# Patient Record
Sex: MALE | NOT HISPANIC OR LATINO | URBAN - METROPOLITAN AREA
[De-identification: names, ages, dates, MRNs, and addresses within clinical notes are randomized per-mention and may not be internally consistent; named-entity substitution may affect disease eponyms.]

---

## 2021-03-31 DIAGNOSIS — Z23 ENCOUNTER FOR IMMUNIZATION: ICD-10-CM

## 2022-07-19 ENCOUNTER — OFFICE VISIT (OUTPATIENT)
Dept: CARDIOLOGY CLINIC | Facility: CLINIC | Age: 57
End: 2022-07-19
Payer: COMMERCIAL

## 2022-07-19 VITALS
HEIGHT: 70 IN | HEART RATE: 77 BPM | WEIGHT: 205 LBS | SYSTOLIC BLOOD PRESSURE: 110 MMHG | BODY MASS INDEX: 29.35 KG/M2 | DIASTOLIC BLOOD PRESSURE: 60 MMHG

## 2022-07-19 DIAGNOSIS — R55 SYNCOPE AND COLLAPSE: Primary | ICD-10-CM

## 2022-07-19 DIAGNOSIS — I10 PRIMARY HYPERTENSION: ICD-10-CM

## 2022-07-19 PROCEDURE — 93000 ELECTROCARDIOGRAM COMPLETE: CPT | Performed by: INTERNAL MEDICINE

## 2022-07-19 PROCEDURE — 99204 OFFICE O/P NEW MOD 45 MIN: CPT | Performed by: INTERNAL MEDICINE

## 2022-07-19 RX ORDER — CANDESARTAN 16 MG/1
16 TABLET ORAL DAILY
Qty: 30 TABLET | Refills: 11 | Status: SHIPPED | OUTPATIENT
Start: 2022-07-19

## 2022-07-19 RX ORDER — OMEPRAZOLE 20 MG/1
20 CAPSULE, DELAYED RELEASE ORAL DAILY
COMMUNITY

## 2022-07-19 RX ORDER — FAMOTIDINE 20 MG/1
20 TABLET, FILM COATED ORAL DAILY
COMMUNITY

## 2022-07-19 RX ORDER — OMEGA-3 FATTY ACIDS CAP DELAYED RELEASE 1000 MG 1000 MG
1 CAPSULE DELAYED RELEASE ORAL
COMMUNITY

## 2022-07-19 RX ORDER — CANDESARTAN CILEXETIL AND HYDROCHLOROTHIAZIDE 16; 12.5 MG/1; MG/1
1 TABLET ORAL DAILY
COMMUNITY
Start: 2022-05-19 | End: 2022-07-19

## 2022-07-19 RX ORDER — ZOLPIDEM TARTRATE 6.25 MG/1
6.25 TABLET, FILM COATED, EXTENDED RELEASE ORAL
COMMUNITY
Start: 2022-07-18

## 2022-07-19 RX ORDER — ALPRAZOLAM 0.5 MG/1
0.5 TABLET ORAL DAILY PRN
COMMUNITY
Start: 2022-06-15

## 2022-07-19 NOTE — PROGRESS NOTES
[address] [facility]  Office Consultation  Annmarie Medina 65 y o  male MRN: [id number]        Chief Complaint    Chief Complaint   Patient presents with    Loss of Consciousness     NP - one episode of syncope appx 2 mos ago following not eating  No cardiac sx  Referring Provider: No ref  provider found    Impression & Plan:    1  Syncope and collapse  Event was likely due to hypovolemia in the setting of decreased p o  intake and HCTZ therapy  Labs at the time revealed EYAD as well  Now symptoms have mostly resolved  His blood pressure goal is under 130/80, he is adequately below goal today 110/60  I have recommended stopping HCTZ and we will start candesartan stand-alone 60 mg daily  He will follow up with his nephrologist in 1 month  He is having frequent PACs on echo  We will check a Holter monitor to rule out arrhythmias   - POCT ECG  - Holter monitor; Future    2  Primary hypertension  As above  - candesartan (ATACAND) 16 mg tablet; Take 1 tablet (16 mg total) by mouth daily  Dispense: 30 tablet; Refill: 11        We will see Annmarie Medina back PRN    HPI: Annmarie Medina is a 65y o  year old male who presents for further evaluation of syncope  He was seen by his primary care on 06/09/2022 [facility])  I reviewed the documentation in its entirety  He had complained to them of fainting on 06/04/2022, at which time he went to the ER but then returned home due to the long wait  He complained of persistent lightheadedness and weakness at that time  He had been having diverticulitis symptoms, long history of complicated diverticulitis with multiple surgeries  He was tx with antibiotics and had been watching what he was eating  He was eating and drinking less  Finally he started having lightheadedness  On 6/4/22 he was laying down trying to nap, couldn't sleep, stood up fast and pretty soon thereafter lost consciousness  He is unsure how long he was on the floor           Review of Systems   Constitutional: Negative for activity change and unexpected weight change  HENT: Negative for facial swelling  Eyes: Negative for visual disturbance  Respiratory: Negative for cough and chest tightness  Cardiovascular: Negative for chest pain  Gastrointestinal: Negative for blood in stool  Musculoskeletal: Negative for myalgias  Skin: Negative for pallor  Allergic/Immunologic: Negative for immunocompromised state  Neurological: Negative for syncope and light-headedness  Psychiatric/Behavioral: Negative for agitation and hallucinations  No past medical history on file  No past surgical history on file  Social History     Substance and Sexual Activity   Alcohol Use Not on file     Social History     Substance and Sexual Activity   Drug Use Not on file     Social History     Tobacco Use   Smoking Status Not on file   Smokeless Tobacco Not on file     No family history on file  Allergies: Allergies   Allergen Reactions    Naproxen Abdominal Pain and Nausea Only    Nsaids Other (See Comments)       Medications (as of START of this encounter):   No outpatient medications prior to visit  No facility-administered medications prior to visit  There were no vitals filed for this visit  Weight (last 2 days)     Date/Time Weight    07/19/22 1408 93 (205)          General: Arnel Patel is a well appearing male, in no acute distress, sitting comfortably  HEENT: moist mucous membranes, EOMI  Neck:  No JVD, supple, trachea midline   Cardiovascular: unremarkable S1/S2, regular rate and rhythm, no murmurs, rubs or gallops   Pulmonary: normal respiratory effort, CTAB   Abdomen: soft and nondistended  Extremities: No lower extremity edema  Warm and well perfused extremities     Neuro: no focal motor deficits, AAOx3 (person, place, time)  Psych: Normal mood and affect, cooperative      Laboratory Studies:    Laboratory studies personally reviewed  CMP 06/10/2022, creatinine 1 76, BUN 30, otherwise unremarkable electrolytes and liver enzymes  CBC 6/10/22 hemoglobin of 16 9, WBC 8 3, platelets elevated 351  TSH 06/10/2022 1 41    Cardiac testing:     EKG reviewed personally:   Personally reviewed and independently reinterpreted EKG from 06/09/2022 (scanned media 07/07/2022) which shows normal sinus rhythm, 75 bpm, normal axis, normal intervals  Nonspecific inferior T-wave flattening, otherwise unremarkable  EKG today shows sinus rhythm, frequent premature atrial complexes with aberrant conduction, normal axis, normal intervals  Borderline study  Time Spent:  Total time (face-to-face and non-face-to-face) spent on today's visit was 40 minutes  This includes preparation for the visits (i e  reviewing test results), performance of a medically appropriate history and examination, and orders for medications, tests or other procedures  This time is exclusive of procedures performed and time spent teaching  Stacie Gomez MD    This note was completed in part utilizing [employer] direct voice recognition software  Grammatical errors, random word insertion, spelling mistakes, occasional wrong word or "sound-alike" substitutions and incomplete sentences may be an occasional consequence of the system secondary to software limitations, ambient noise and hardware issues  At the time of dictation, efforts were made to edit, clarify and /or correct errors  Please read the chart carefully and recognize, using context, where substitutions have occurred  If you have any questions or concerns about the context, text or information contained within the body of this dictation, please contact myself, the provider, for further clarification

## 2022-07-22 ENCOUNTER — HOSPITAL ENCOUNTER (OUTPATIENT)
Dept: NON INVASIVE DIAGNOSTICS | Facility: HOSPITAL | Age: 57
Discharge: HOME | End: 2022-07-22
Attending: INTERNAL MEDICINE
Payer: COMMERCIAL

## 2022-07-22 DIAGNOSIS — R55 SYNCOPE AND COLLAPSE: ICD-10-CM

## 2022-07-22 PROCEDURE — 93226 XTRNL ECG REC<48 HR SCAN A/R: CPT

## 2022-07-22 PROCEDURE — 93225 XTRNL ECG REC<48 HRS REC: CPT

## 2022-08-02 ENCOUNTER — PATIENT MESSAGE (OUTPATIENT)
Dept: CARDIOLOGY CLINIC | Facility: CLINIC | Age: 57
End: 2022-08-02

## 2022-08-02 PROCEDURE — 93227 XTRNL ECG REC<48 HR R&I: CPT | Performed by: INTERNAL MEDICINE

## 2022-08-03 ENCOUNTER — TELEPHONE (OUTPATIENT)
Dept: CARDIOLOGY CLINIC | Facility: CLINIC | Age: 57
End: 2022-08-03

## 2022-08-03 DIAGNOSIS — I49.1 PAC (PREMATURE ATRIAL CONTRACTION): Primary | ICD-10-CM

## 2022-08-03 RX ORDER — METOPROLOL SUCCINATE 25 MG/1
25 TABLET, EXTENDED RELEASE ORAL DAILY
Qty: 90 TABLET | Refills: 0 | Status: SHIPPED | OUTPATIENT
Start: 2022-08-03 | End: 2022-10-19

## 2022-08-03 NOTE — TELEPHONE ENCOUNTER
----- Message from Francisco Stewart MD sent at 8/3/2022 10:05 AM EDT -----  Please let Mr Harvey Rapp know that his Holter showed very frequent premature heart beats as we expected, but no arrhythmias  I would like to try to suppress these premature beats to some extent with metoprolol XL 25 mg daily to start  If he is agreeable please Rx for cosign  We can then see him back in 3 months to re-evaluate

## 2022-08-10 ENCOUNTER — TELEPHONE (OUTPATIENT)
Dept: CARDIOLOGY CLINIC | Facility: CLINIC | Age: 57
End: 2022-08-10

## 2022-08-10 NOTE — TELEPHONE ENCOUNTER
Pt scheduled for colonoscopy on 8/25/22 with Christina Cao MD      Pt LOV 7/19/22     Ok to clear with letter?     Please advise

## 2022-10-18 DIAGNOSIS — I49.1 PAC (PREMATURE ATRIAL CONTRACTION): ICD-10-CM

## 2022-10-19 RX ORDER — METOPROLOL SUCCINATE 25 MG/1
TABLET, EXTENDED RELEASE ORAL
Qty: 90 TABLET | Refills: 0 | Status: SHIPPED | OUTPATIENT
Start: 2022-10-19

## 2022-12-08 ENCOUNTER — OFFICE VISIT (OUTPATIENT)
Dept: CARDIOLOGY CLINIC | Facility: CLINIC | Age: 57
End: 2022-12-08

## 2022-12-08 VITALS
OXYGEN SATURATION: 97 % | WEIGHT: 207.8 LBS | HEIGHT: 70 IN | SYSTOLIC BLOOD PRESSURE: 122 MMHG | BODY MASS INDEX: 29.75 KG/M2 | DIASTOLIC BLOOD PRESSURE: 78 MMHG | HEART RATE: 52 BPM

## 2022-12-08 DIAGNOSIS — I10 PRIMARY HYPERTENSION: Primary | ICD-10-CM

## 2022-12-08 DIAGNOSIS — I49.3 PVC (PREMATURE VENTRICULAR CONTRACTION): ICD-10-CM

## 2022-12-08 DIAGNOSIS — I49.1 PAC (PREMATURE ATRIAL CONTRACTION): ICD-10-CM

## 2022-12-08 NOTE — PROGRESS NOTES
Cardiology Outpatient Follow-Up Note - Valerio Shah 65 y o  male MRN: [id number]      Assessment/Plan:  1  Primary hypertension  We previously stopped HCTZ  His blood pressure is currently controlled  Goal blood pressure under 130/80  He is presently on candesartan 16 mg daily and metoprolol succinate 25 mg daily   - Echo complete w/ contrast if indicated; Future    2  PVC (premature ventricular contraction)  - Echo complete w/ contrast if indicated; Future    3  PAC (premature atrial contraction)  - Echo complete w/ contrast if indicated; Future  - Holter monitor; Future    The patient had a premature ectopic burden of about 30%  This was analyzed as about 16% PVC burden and 13% PAC burden  In review of his Holter monitor strips, I believe he is likely having almost entirely premature supraventricular ectopy with some aberrantly conducted beats  This does lower my concern as a high PVC burden would be more associated with cardiomyopathy, whereas premature supraventricular ectopy tends to be more benign  We will check an echocardiogram to rule out structural abnormalities  We will continue suppressive therapy with metoprolol succinate 25 mg daily  Due to resting sinus bradycardia, he would not tolerate further increase of metoprolol or addition of flecainide at this time  We will check a repeat Holter to requantify his ectopic burden on metoprolol therapy  We will see Valerio Shah back in 6 months for routine follow-up  Subjective:     HPI: Valerio Shah is a 65y o  year old male with hypertension who initially presented to me on 7/19/2022 with complaints of syncope  Since then we have found that he has extremely frequent premature supraventricular ectopy  On initial evaluation we found that his syncope was likely due to hypovolemia in the setting of decreased p o  intake and concomitant hydrochlorothiazide therapy    At that time we stopped hydrochlorothiazide and his symptoms have totally resolved  He has had no recurrent syncope or presyncope  We did perform a Holter monitor on 7/22/2022  Holter monitor on 7/22/2022 personally reviewed and independently read interpreted by me showed an average heart rate of 68 bpm, predominantly sinus rhythm, very frequent PVCs and PACs  PVCs (by automatic count) comprise 16 4% of monitored beats, all single ventricular ectopics without any sustained ventricular arrhythmias  Supraventricular ectopics comprise 13 3% of monitored beats, mostly single PACs and atrial trigeminy  There were rare atrial runs lasting only up to 3 beats  Review of many of the reported PVCs suggest PAC with aberrant conduction, as preceding P wave is frequently visible  The patient has no symptoms of PACs or PVCs whatsoever  He does not feel an irregular heartbeat  He has not had recurrent lightheadedness, presyncope  He has no signs or symptoms of congestive heart failure  ROS:  Review of Systems:  Review of Systems   Constitutional: Negative for activity change and unexpected weight change  HENT: Negative for facial swelling  Eyes: Negative for visual disturbance  Respiratory: Negative for cough and chest tightness  Cardiovascular: Negative for chest pain  Gastrointestinal: Negative for blood in stool  Musculoskeletal: Negative for myalgias  Skin: Negative for pallor  Allergic/Immunologic: Negative for immunocompromised state  Neurological: Negative for syncope and light-headedness  Psychiatric/Behavioral: Negative for agitation and hallucinations  Objective:     Vitals:   Vitals:    12/08/22 1457   BP: 122/78   BP Location: Left arm   Patient Position: Sitting   Cuff Size: Standard   Pulse: (!) 52   SpO2: 97%   Weight: 94 3 kg (207 lb 12 8 oz)   Height: 5' 10" (1 778 m)    Body surface area is 2 12 meters squared    Wt Readings from Last 3 Encounters:   12/08/22 94 3 kg (207 lb 12 8 oz)   07/19/22 93 kg (205 lb)       Physical Exam:  General: Twyla Perez is a well appearing male, in no acute distress, sitting comfortably  HEENT: moist mucous membranes, EOMI  Neck:  No JVD, supple, trachea midline  Cardiovascular: unremarkable S1/S2, regular rate, regular rhythm with frequent extrasystoles, no murmurs, rubs or gallops  Pulmonary: normal respiratory effort, CTAB  Abdomen: soft and nondistended  Extremities: No lower extremity edema  Warm and well perfused extremities  Neuro: no focal motor deficits, AAOx3 (person, place, time)  Psych: Normal mood and affect, cooperative      Medications (at the START of this encounter): Outpatient Medications Prior to Visit   Medication Sig Dispense Refill   • ALPRAZolam (XANAX) 0 5 mg tablet Take 0 5 mg by mouth daily as needed     • candesartan (ATACAND) 16 mg tablet Take 1 tablet (16 mg total) by mouth daily 30 tablet 11   • Cholecalciferol 25 MCG (1000 UT) capsule Take 2,000 Units by mouth daily     • famotidine (PEPCID) 20 mg tablet Take 20 mg by mouth daily     • metoprolol succinate (TOPROL-XL) 25 mg 24 hr tablet TAKE ONE TABLET BY MOUTH EVERY DAY 90 tablet 0   • Omega-3 Fatty Acids (Fish Oil) 1000 MG CPDR Take 1 capsule by mouth     • omeprazole (PriLOSEC) 20 mg delayed release capsule Take 20 mg by mouth daily     • zolpidem (AMBIEN CR) 6 25 MG CR tablet Take 6 25 mg by mouth daily at bedtime as needed       No facility-administered medications prior to visit  Labs & Results:        EKG personally reviewed:  n/a        Time Spent:  Total time (face-to-face and non-face-to-face) spent on today's visit was 25 minutes  This includes preparation for the visits (i e  reviewing test results), performance of a medically appropriate history and examination, and orders for medications, tests or other procedures  This time is exclusive of procedures performed and time spent teaching  This note was completed in part utilizing [employer] direct voice recognition software  Grammatical errors, random word insertion, spelling mistakes, occasional wrong word or "sound-alike" substitutions and incomplete sentences may be an occasional consequence of the system secondary to software limitations, ambient noise and hardware issues  At the time of dictation, efforts were made to edit, clarify and /or correct errors  Please read the chart carefully and recognize, using context, where substitutions have occurred  If you have any questions or concerns about the context, text or information contained within the body of this dictation, please contact myself, the provider, for further clarification

## 2022-12-16 ENCOUNTER — HOSPITAL ENCOUNTER (OUTPATIENT)
Dept: NON INVASIVE DIAGNOSTICS | Age: 57
Discharge: HOME | End: 2022-12-16

## 2022-12-16 VITALS
HEIGHT: 70 IN | HEART RATE: 56 BPM | BODY MASS INDEX: 29.63 KG/M2 | DIASTOLIC BLOOD PRESSURE: 68 MMHG | SYSTOLIC BLOOD PRESSURE: 104 MMHG | WEIGHT: 207 LBS

## 2022-12-16 DIAGNOSIS — I10 PRIMARY HYPERTENSION: ICD-10-CM

## 2022-12-16 DIAGNOSIS — I49.1 PAC (PREMATURE ATRIAL CONTRACTION): ICD-10-CM

## 2022-12-16 DIAGNOSIS — I49.3 PVC (PREMATURE VENTRICULAR CONTRACTION): ICD-10-CM

## 2022-12-16 LAB
AORTIC ROOT: 3.3 CM
APICAL FOUR CHAMBER EJECTION FRACTION: 63 %
ASCENDING AORTA: 3.1 CM
DOP CALC LVOT AREA: 3.46 CM2
DOP CALC LVOT DIAMETER: 2.1 CM
E WAVE DECELERATION TIME: 215 MS
FRACTIONAL SHORTENING: 40 % (ref 28–44)
INTERVENTRICULAR SEPTUM IN DIASTOLE (PARASTERNAL SHORT AXIS VIEW): 1 CM
INTERVENTRICULAR SEPTUM: 1.1 CM (ref 0.6–1.1)
LAAS-AP2: 18 CM2
LAAS-AP4: 17.8 CM2
LEFT ATRIUM AREA SYSTOLE SINGLE PLANE A4C: 16.7 CM2
LEFT ATRIUM SIZE: 4.3 CM
LEFT INTERNAL DIMENSION IN SYSTOLE: 3 CM (ref 2.1–4)
LEFT VENTRICULAR INTERNAL DIMENSION IN DIASTOLE: 5 CM (ref 3.5–6)
LEFT VENTRICULAR POSTERIOR WALL IN END DIASTOLE: 0.9 CM
LEFT VENTRICULAR STROKE VOLUME: 84 ML
LVSV (TEICH): 84 ML
MV E'TISSUE VEL-SEP: 10 CM/S
MV PEAK A VEL: 0.62 M/S
MV PEAK E VEL: 61 CM/S
MV STENOSIS PRESSURE HALF TIME: 62 MS
MV VALVE AREA P 1/2 METHOD: 3.55 CM2
RIGHT ATRIUM AREA SYSTOLE A4C: 18 CM2
RIGHT VENTRICLE ID DIMENSION: 4.1 CM
SL CV LEFT ATRIUM LENGTH A2C: 5.8 CM
SL CV LV EF: 60
SL CV PED ECHO LEFT VENTRICLE DIASTOLIC VOLUME (MOD BIPLANE) 2D: 121 ML
SL CV PED ECHO LEFT VENTRICLE SYSTOLIC VOLUME (MOD BIPLANE) 2D: 36 ML
TRICUSPID ANNULAR PLANE SYSTOLIC EXCURSION: 2.8 CM

## 2022-12-19 ENCOUNTER — TELEPHONE (OUTPATIENT)
Dept: CARDIOLOGY CLINIC | Facility: CLINIC | Age: 57
End: 2022-12-19

## 2023-01-03 ENCOUNTER — TELEPHONE (OUTPATIENT)
Dept: CARDIOLOGY CLINIC | Facility: CLINIC | Age: 58
End: 2023-01-03

## 2023-01-03 NOTE — TELEPHONE ENCOUNTER
----- Message from Blaine Walters MD sent at 1/3/2023  2:15 PM EST -----  Please call patient  He is having an improved number of premature beats, although still much more frequent than normal  The good news is that of the two types of premature beats we discussed at the last office visit, they are of the less concerning variety  Nothing to do at this time  Continue current therapy

## 2023-01-25 DIAGNOSIS — I49.1 PAC (PREMATURE ATRIAL CONTRACTION): ICD-10-CM

## 2023-01-26 RX ORDER — METOPROLOL SUCCINATE 25 MG/1
TABLET, EXTENDED RELEASE ORAL
Qty: 90 TABLET | Refills: 3 | Status: SHIPPED | OUTPATIENT
Start: 2023-01-26

## 2023-06-19 ENCOUNTER — HOSPITAL ENCOUNTER (EMERGENCY)
Facility: HOSPITAL | Age: 58
Discharge: HOME | End: 2023-06-20
Attending: EMERGENCY MEDICINE | Admitting: EMERGENCY MEDICINE
Payer: COMMERCIAL

## 2023-06-19 VITALS
DIASTOLIC BLOOD PRESSURE: 88 MMHG | SYSTOLIC BLOOD PRESSURE: 156 MMHG | HEART RATE: 55 BPM | TEMPERATURE: 97.7 F | RESPIRATION RATE: 18 BRPM | BODY MASS INDEX: 31.4 KG/M2 | OXYGEN SATURATION: 100 % | WEIGHT: 212 LBS | HEIGHT: 69 IN

## 2023-06-19 DIAGNOSIS — M54.12 CERVICAL RADICULOPATHY: Primary | ICD-10-CM

## 2023-06-19 PROCEDURE — 99283 EMERGENCY DEPT VISIT LOW MDM: CPT

## 2023-06-19 PROCEDURE — 96372 THER/PROPH/DIAG INJ SC/IM: CPT

## 2023-06-19 RX ORDER — CYCLOBENZAPRINE HCL 10 MG
10 TABLET ORAL 2 TIMES DAILY PRN
Qty: 20 TABLET | Refills: 0 | Status: SHIPPED | OUTPATIENT
Start: 2023-06-19

## 2023-06-19 RX ORDER — KETOROLAC TROMETHAMINE 30 MG/ML
15 INJECTION, SOLUTION INTRAMUSCULAR; INTRAVENOUS ONCE
Status: COMPLETED | OUTPATIENT
Start: 2023-06-19 | End: 2023-06-19

## 2023-06-19 RX ORDER — CYCLOBENZAPRINE HCL 10 MG
10 TABLET ORAL ONCE
Status: COMPLETED | OUTPATIENT
Start: 2023-06-19 | End: 2023-06-19

## 2023-06-19 RX ORDER — LIDOCAINE 50 MG/G
1 PATCH TOPICAL ONCE
Status: DISCONTINUED | OUTPATIENT
Start: 2023-06-19 | End: 2023-06-20 | Stop reason: HOSPADM

## 2023-06-19 RX ORDER — ACETAMINOPHEN 325 MG/1
975 TABLET ORAL ONCE
Status: COMPLETED | OUTPATIENT
Start: 2023-06-19 | End: 2023-06-19

## 2023-06-19 RX ADMIN — ACETAMINOPHEN 975 MG: 325 TABLET, FILM COATED ORAL at 23:21

## 2023-06-19 RX ADMIN — KETOROLAC TROMETHAMINE 15 MG: 30 INJECTION, SOLUTION INTRAMUSCULAR; INTRAVENOUS at 23:21

## 2023-06-19 RX ADMIN — CYCLOBENZAPRINE 10 MG: 10 TABLET, FILM COATED ORAL at 23:21

## 2023-06-19 RX ADMIN — LIDOCAINE 1 PATCH: 50 PATCH TOPICAL at 23:21

## 2023-06-20 ENCOUNTER — NURSE TRIAGE (OUTPATIENT)
Dept: PHYSICAL THERAPY | Facility: OTHER | Age: 58
End: 2023-06-20

## 2023-06-20 DIAGNOSIS — M54.2 ACUTE NECK PAIN: Primary | ICD-10-CM

## 2023-06-20 NOTE — TELEPHONE ENCOUNTER
Additional Information  • Negative: Has the patient had unexplained weight loss? • Negative: Does the patient have a fever? • Negative: Is the patient experiencing urine retention? • Negative: Is the patient experiencing blood in sputum? • Negative: Is this a chronic condition? • Negative: Is the patient experiencing acute drop foot or paralysis? • Negative: Has the patient experienced major trauma? (fall from height, high speed collision, direct blow to spine) and is also experiencing nausea, light-headedness, or loss of consciousness? Protocols used: [facility] COMPREHENSIVE SPINE PROGRAM PROTOCOL    This RN did review in detail the Comprehensive Spine Program and what we can provide for their neck pain  Patient is agreeable to being triaged by this RN and would like to proceed with Physical Therapy  Referral was placed for Physical Therapy at the [facility] site  Patients information was sent to the  to make evaluation appointment  Patient made aware that the PT office  will be calling to schedule the appointment  Patient was provided with the phone number to the PT office  No further questions and/or concerns were voiced by the patient at this time  Patient states understanding of the referral that was placed  Referral Closed

## 2023-06-20 NOTE — DISCHARGE INSTRUCTIONS
Take 10 mg flexeril every 12 hours   Take ibuprofen or tylenol every 4-6 hours for pain  Can alternate them every 3 hours as needed   Rest, use heating pads, ice on your back   Avoid heavy lifting   Lidocaine patches for 12 hours on and 12 hours off   Schedule an appointment with the comprehensive spine program   Return to the ER if you develop intense neck pain that is worse or different than before, cant feel or move your arms or legs, numbness, weakness, develop pelvic numbness, bowel or bladder incontinence, fevers, chest pain, shortness of breath, intense headache, facial droop, numbness, weakness, difficulty speaking/walking

## 2023-06-20 NOTE — ED PROVIDER NOTES
History  Chief Complaint   Patient presents with   • Neck Pain     Left neck pain into shoulder  States he has been seen for it multiple times, including a surgeon  Given steroids and gabapentin with no relief  Has a known cyst on left upper back and is scheduled for surgery  Reported doctor would not give anymore pain meds due to surgery being so close  This is a 52-year-old male with past medical history hypertension who presents to the ER today for left-sided neck pain radiating down into left that started on 10 days ago  States he went to urgent care Sunday was prescribed prednisone and methocarbamol with no relief  He states he does have a cyst on his back which he thought was causing the pain so he scheduled an appointment with the surgeon and he has surgery to get that removed on 6/30  Patient states the pain is an 8 or 9 out of 10  States he just woke up with it 1 day  He does admit that 1 or 2 days prior he felt some neck pain but that went away  He denies any heavy lifting, injury to the area  Does admit to intermittent tingling down his left arm  Denies decreased range of motion, decreased sensation, numbness, fevers, chest pain, shortness of breath, headaches, neck stiffness  States at home he tried lidocaine patch, Tylenol with no relief  Dates he has Buerger's disease so he was told that he is unable to take any NSAIDs and only use Tylenol if absolutely necessary  Does admit to a history of intermittent neck pain in the past but has never seen a spinal doctor for it      History provided by:  Patient   used: No    Neck Pain  Pain location:  L side  Pain radiates to:  L arm  Pain severity:  Severe  Duration:  10 days  Timing:  Constant  Progression:  Unchanged  Chronicity:  New  Associated symptoms: no fever and no headaches        Prior to Admission Medications   Prescriptions Last Dose Informant Patient Reported? Taking?    ALPRAZolam (XANAX) 0 5 mg tablet  Self Yes No   Sig: Take 0 5 mg by mouth daily as needed   Cholecalciferol 25 MCG (1000 UT) capsule  Self Yes No   Sig: Take 2,000 Units by mouth daily   Omega-3 Fatty Acids (Fish Oil) 1000 MG CPDR  Self Yes No   Sig: Take 1 capsule by mouth   candesartan (ATACAND) 16 mg tablet  Self No No   Sig: Take 1 tablet (16 mg total) by mouth daily   famotidine (PEPCID) 20 mg tablet  Self Yes No   Sig: Take 20 mg by mouth daily   metoprolol succinate (TOPROL-XL) 25 mg 24 hr tablet   No No   Sig: TAKE ONE TABLET BY MOUTH EVERY DAY   omeprazole (PriLOSEC) 20 mg delayed release capsule  Self Yes No   Sig: Take 20 mg by mouth daily   zolpidem (AMBIEN CR) 6 25 MG CR tablet  Self Yes No   Sig: Take 6 25 mg by mouth daily at bedtime as needed      Facility-Administered Medications: None       No past medical history on file  No past surgical history on file  No family history on file  I have reviewed and agree with the history as documented  E-Cigarette/Vaping     E-Cigarette/Vaping Substances          Review of Systems   Constitutional: Negative for chills and fever  Musculoskeletal: Positive for neck pain  Negative for back pain and neck stiffness  Skin: Negative for color change  Neurological: Negative for dizziness, light-headedness and headaches  Psychiatric/Behavioral: Negative for behavioral problems and sleep disturbance  Physical Exam  Physical Exam  Vitals and nursing note reviewed  Constitutional:       General: He is awake  Appearance: Normal appearance  He is well-developed  HENT:      Head: Normocephalic and atraumatic  Right Ear: External ear normal       Left Ear: External ear normal       Nose: Nose normal    Eyes:      General: No scleral icterus  Extraocular Movements: Extraocular movements intact  Neck:     Cardiovascular:      Rate and Rhythm: Normal rate and regular rhythm  Heart sounds: Normal heart sounds, S1 normal and S2 normal  No murmur heard  No gallop  Pulmonary:      Effort: Pulmonary effort is normal       Breath sounds: Normal breath sounds  No wheezing, rhonchi or rales  Musculoskeletal:         General: Normal range of motion  Right shoulder: Normal       Cervical back: Full passive range of motion without pain and normal range of motion  No rigidity  Muscular tenderness present  No spinous process tenderness  Comments: Pain with extension and adduction of left shoulder  No tenderness to palpation  nv and sensation intact  5/5 strength and full ROM  Skin:     General: Skin is warm and dry  Comments: Cyst noted on left side of back above scapula  No signs of infection noted  Does not extend over midlie  Neurological:      General: No focal deficit present  Mental Status: He is alert  Psychiatric:         Attention and Perception: Attention and perception normal          Mood and Affect: Mood normal          Behavior: Behavior normal  Behavior is cooperative           Vital Signs  ED Triage Vitals [06/19/23 1938]   Temperature Pulse Respirations Blood Pressure SpO2   97 7 °F (36 5 °C) 55 18 156/88 100 %      Temp Source Heart Rate Source Patient Position - Orthostatic VS BP Location FiO2 (%)   Temporal Monitor Sitting Left arm --      Pain Score       8           Vitals:    06/19/23 1938   BP: 156/88   Pulse: 55   Patient Position - Orthostatic VS: Sitting         Visual Acuity      ED Medications  Medications   lidocaine (LIDODERM) 5 % patch 1 patch (1 patch Topical Medication Applied 6/19/23 2321)   cyclobenzaprine (FLEXERIL) tablet 10 mg (10 mg Oral Given 6/19/23 2321)   ketorolac (TORADOL) injection 15 mg (15 mg Intramuscular Given 6/19/23 2321)   acetaminophen (TYLENOL) tablet 975 mg (975 mg Oral Given 6/19/23 2321)       Diagnostic Studies  Results Reviewed     None                 No orders to display              Procedures  Procedures         ED Course  ED Course as of 06/20/23 0122   Mon Jun 19, 2023   2177 Patient reports pain went down from 8/10 to 6/10  Stable for d/c with f/u to comprehensive spine program                                              Medical Decision Making  62 y/o male here for left sided neck pain x 10 days  Clinical diagnosis of cervical radiculopathy  Assessment: cervical radiculopathy  Plan: toradol, lidocaine patch, flexaril and tylenol in ED did help to relieve some pain  Sent referral to comprehensive spine program  Prescribed flexaril to use as needed  He  was given strict return to ER precautions both verbally and in discharge papers  Patient verbalized understanding and agrees with plan  Risk  OTC drugs  Prescription drug management  Disposition  Final diagnoses:   Cervical radiculopathy     Time reflects when diagnosis was documented in both MDM as applicable and the Disposition within this note     Time User Action Codes Description Comment    6/19/2023 11:58 PM Jhonatan James Add [T63 67] Cervical radiculopathy       ED Disposition     ED Disposition   Discharge    Condition   Stable    Date/Time   Mon Jun 19, 2023 11:58 PM    Comment   Gary Benoit discharge to home/self care                 Follow-up Information     Follow up With Specialties Details Why Contact Info Additional Information    [email or web address] Schedule an appointment as soon as possible for a visit   [address] [place] [address] [phone]  [address], [address] [place], [place], [address] [place] [address]     [address] Emergency Department Emergency Medicine Go to  if you develop intense neck pain that is worse or different than before, cant feel or move your arms or legs, numbness, weakness, develop pelvic numbness, bowel or bladder incontinence, fevers, chest pain, shortness of breath, intense headache, facial droop, numbness, weakness, difficulty speaking/walking [address]  [phone]  [address] [facility] Emergency Department, [address] [place] [address] Uriel Valladares, Carl [address]          Discharge Medication List as of 6/20/2023 12:00 AM      START taking these medications    Details   cyclobenzaprine (FLEXERIL) 10 mg tablet Take 1 tablet (10 mg total) by mouth 2 (two) times a day as needed for muscle spasms, Starting Mon 6/19/2023, Normal         CONTINUE these medications which have NOT CHANGED    Details   ALPRAZolam (XANAX) 0 5 mg tablet Take 0 5 mg by mouth daily as needed, Starting Wed 6/15/2022, Historical Med      candesartan (ATACAND) 16 mg tablet Take 1 tablet (16 mg total) by mouth daily, Starting Tue 7/19/2022, Normal      Cholecalciferol 25 MCG (1000 UT) capsule Take 2,000 Units by mouth daily, Historical Med      famotidine (PEPCID) 20 mg tablet Take 20 mg by mouth daily, Historical Med      metoprolol succinate (TOPROL-XL) 25 mg 24 hr tablet TAKE ONE TABLET BY MOUTH EVERY DAY, Normal      Omega-3 Fatty Acids (Fish Oil) 1000 MG CPDR Take 1 capsule by mouth, Historical Med      omeprazole (PriLOSEC) 20 mg delayed release capsule Take 20 mg by mouth daily, Historical Med      zolpidem (AMBIEN CR) 6 25 MG CR tablet Take 6 25 mg by mouth daily at bedtime as needed, Starting Mon 7/18/2022, Historical Med                 PDMP Review     None          ED Provider  Electronically Signed by           Linda Zacarias PA-C  06/20/23 0122

## 2023-06-20 NOTE — TELEPHONE ENCOUNTER
Additional Information  • Negative: Is this related to a work injury? • Negative: Is this related to an MVA? • Negative: Are you currently recieving homecare services? Background - Initial Assessment  Clinical complaint: ED visit on 06/19/23 due to Left sided Neck pain  Patient states pain started on 06/10 , it radiates to left shoulder blade down to his elbow  Numbness and tingling on his left shoulder and arm  Hx of cyst on his left shoulder blade   Seen by a Dr at [facility]  whom referred patient to a surgeon for the cyst, sx scheduled on 06/30  Patient states neck pain is constant, worse with movement or certain position  Pain described as stabbing, shooting, aching and throbbing  He is currently taking prednisone and medication for the pain with no relief  Date of onset: 06/10/23  Frequency of pain: constant / persistent  Quality of pain: aching, numb, shooting, stabbing, throbbing and tingling      Protocols used: SL AMB COMPREHENSIVE SPINE PROGRAM PROTOCOL

## 2023-07-06 ENCOUNTER — TELEPHONE (OUTPATIENT)
Dept: PHYSICAL THERAPY | Facility: OTHER | Age: 58
End: 2023-07-06

## 2023-07-06 NOTE — TELEPHONE ENCOUNTER
Patient was triaged by [facility] on 06/20. [facility] contacted the patient on 06/26 to schedule an eval. Referral was canceled for the following reason:    Reason: Performed Elsewhere.

## 2023-11-15 LAB — HBA1C MFR BLD HPLC: 5.2 %

## 2024-01-22 DIAGNOSIS — I49.1 PAC (PREMATURE ATRIAL CONTRACTION): ICD-10-CM

## 2024-01-22 RX ORDER — METOPROLOL SUCCINATE 25 MG/1
TABLET, EXTENDED RELEASE ORAL
Qty: 30 TABLET | Refills: 1 | Status: SHIPPED | OUTPATIENT
Start: 2024-01-22

## 2024-02-27 DIAGNOSIS — I49.1 PAC (PREMATURE ATRIAL CONTRACTION): ICD-10-CM

## 2024-02-27 NOTE — TELEPHONE ENCOUNTER
I got a letter from [facility] that I need to change the quantity of my [employer] from 30 days to 90 days or they will no longer cover it. So if you can get back to me, [phone], let me know you got this message. Thank you.  You received a voice mail from [facility].

## 2024-02-28 RX ORDER — METOPROLOL SUCCINATE 25 MG/1
25 TABLET, EXTENDED RELEASE ORAL DAILY
Qty: 90 TABLET | Refills: 1 | Status: SHIPPED | OUTPATIENT
Start: 2024-02-28

## 2024-02-28 NOTE — TELEPHONE ENCOUNTER
Called, spoke to pt.     Overdue for appt - f/u appt made for May.    Will refill 90 days, 1 refill.

## 2024-05-01 ENCOUNTER — OFFICE VISIT (OUTPATIENT)
Dept: CARDIOLOGY CLINIC | Facility: CLINIC | Age: 59
End: 2024-05-01
Payer: COMMERCIAL

## 2024-05-01 VITALS
HEIGHT: 69 IN | HEART RATE: 66 BPM | WEIGHT: 211.6 LBS | DIASTOLIC BLOOD PRESSURE: 86 MMHG | BODY MASS INDEX: 31.34 KG/M2 | SYSTOLIC BLOOD PRESSURE: 120 MMHG

## 2024-05-01 DIAGNOSIS — E78.00 PURE HYPERCHOLESTEROLEMIA: ICD-10-CM

## 2024-05-01 DIAGNOSIS — I49.1 PAC (PREMATURE ATRIAL CONTRACTION): Primary | ICD-10-CM

## 2024-05-01 PROCEDURE — 93000 ELECTROCARDIOGRAM COMPLETE: CPT | Performed by: INTERNAL MEDICINE

## 2024-05-01 PROCEDURE — 99214 OFFICE O/P EST MOD 30 MIN: CPT | Performed by: INTERNAL MEDICINE

## 2024-05-01 RX ORDER — AMITRIPTYLINE HYDROCHLORIDE 10 MG/1
10 TABLET, FILM COATED ORAL
COMMUNITY

## 2024-05-01 RX ORDER — METOPROLOL SUCCINATE 25 MG/1
25 TABLET, EXTENDED RELEASE ORAL DAILY
Qty: 90 TABLET | Refills: 3 | Status: SHIPPED | OUTPATIENT
Start: 2024-05-01

## 2024-05-01 RX ORDER — ROSUVASTATIN CALCIUM 20 MG/1
20 TABLET, COATED ORAL DAILY
Qty: 30 TABLET | Refills: 11 | Status: SHIPPED | OUTPATIENT
Start: 2024-05-01

## 2024-05-20 DIAGNOSIS — E78.00 PURE HYPERCHOLESTEROLEMIA: ICD-10-CM

## 2024-05-21 RX ORDER — ROSUVASTATIN CALCIUM 20 MG/1
20 TABLET, COATED ORAL DAILY
Qty: 90 TABLET | Refills: 3 | Status: SHIPPED | OUTPATIENT
Start: 2024-05-21

## 2024-08-29 DIAGNOSIS — I49.1 PAC (PREMATURE ATRIAL CONTRACTION): ICD-10-CM

## 2024-08-29 RX ORDER — METOPROLOL SUCCINATE 25 MG/1
25 TABLET, EXTENDED RELEASE ORAL DAILY
Qty: 90 TABLET | Refills: 1 | Status: SHIPPED | OUTPATIENT
Start: 2024-08-29

## 2024-11-11 ENCOUNTER — HOSPITAL ENCOUNTER (EMERGENCY)
Facility: HOSPITAL | Age: 59
Discharge: HOME | End: 2024-11-11
Attending: EMERGENCY MEDICINE
Payer: COMMERCIAL

## 2024-11-11 ENCOUNTER — APPOINTMENT (OUTPATIENT)
Dept: RADIOLOGY | Facility: HOSPITAL | Age: 59
End: 2024-11-11
Payer: COMMERCIAL

## 2024-11-11 VITALS
SYSTOLIC BLOOD PRESSURE: 155 MMHG | HEART RATE: 86 BPM | TEMPERATURE: 97.9 F | BODY MASS INDEX: 30.27 KG/M2 | OXYGEN SATURATION: 96 % | WEIGHT: 205 LBS | DIASTOLIC BLOOD PRESSURE: 81 MMHG | RESPIRATION RATE: 18 BRPM

## 2024-11-11 DIAGNOSIS — R07.9 CHEST PAIN: Primary | ICD-10-CM

## 2024-11-11 LAB
ALBUMIN SERPL BCG-MCNC: 4.7 G/DL (ref 3.5–5)
ALP SERPL-CCNC: 62 U/L (ref 34–104)
ALT SERPL W P-5'-P-CCNC: 26 U/L (ref 7–52)
ANION GAP SERPL CALCULATED.3IONS-SCNC: 8 MMOL/L (ref 4–13)
AST SERPL W P-5'-P-CCNC: 22 U/L (ref 13–39)
BASOPHILS # BLD AUTO: 0.08 THOUSANDS/ÂΜL (ref 0–0.1)
BASOPHILS NFR BLD AUTO: 1 % (ref 0–1)
BILIRUB SERPL-MCNC: 1.24 MG/DL (ref 0.2–1)
BUN SERPL-MCNC: 19 MG/DL (ref 5–25)
CALCIUM SERPL-MCNC: 10.8 MG/DL (ref 8.4–10.2)
CARDIAC TROPONIN I PNL SERPL HS: <2 NG/L
CHLORIDE SERPL-SCNC: 103 MMOL/L (ref 96–108)
CO2 SERPL-SCNC: 26 MMOL/L (ref 21–32)
CREAT SERPL-MCNC: 1.04 MG/DL (ref 0.6–1.3)
EOSINOPHIL # BLD AUTO: 0.15 THOUSAND/ÂΜL (ref 0–0.61)
EOSINOPHIL NFR BLD AUTO: 1 % (ref 0–6)
ERYTHROCYTE [DISTWIDTH] IN BLOOD BY AUTOMATED COUNT: 12.8 % (ref 11.6–15.1)
GFR SERPL CREATININE-BSD FRML MDRD: 78 ML/MIN/1.73SQ M
GLUCOSE SERPL-MCNC: 119 MG/DL (ref 65–140)
HCT VFR BLD AUTO: 50.5 % (ref 36.5–49.3)
HGB BLD-MCNC: 17.9 G/DL (ref 12–17)
IMM GRANULOCYTES # BLD AUTO: 0.09 THOUSAND/UL (ref 0–0.2)
IMM GRANULOCYTES NFR BLD AUTO: 1 % (ref 0–2)
LYMPHOCYTES # BLD AUTO: 2.51 THOUSANDS/ÂΜL (ref 0.6–4.47)
LYMPHOCYTES NFR BLD AUTO: 16 % (ref 14–44)
MCH RBC QN AUTO: 30.3 PG (ref 26.8–34.3)
MCHC RBC AUTO-ENTMCNC: 35.4 G/DL (ref 31.4–37.4)
MCV RBC AUTO: 85 FL (ref 82–98)
MONOCYTES # BLD AUTO: 1.26 THOUSAND/ÂΜL (ref 0.17–1.22)
MONOCYTES NFR BLD AUTO: 8 % (ref 4–12)
NEUTROPHILS # BLD AUTO: 11.94 THOUSANDS/ÂΜL (ref 1.85–7.62)
NEUTS SEG NFR BLD AUTO: 73 % (ref 43–75)
NRBC BLD AUTO-RTO: 0 /100 WBCS
PLATELET # BLD AUTO: 488 THOUSANDS/UL (ref 149–390)
PMV BLD AUTO: 9.9 FL (ref 8.9–12.7)
POTASSIUM SERPL-SCNC: 4.2 MMOL/L (ref 3.5–5.3)
PROT SERPL-MCNC: 7.8 G/DL (ref 6.4–8.4)
RBC # BLD AUTO: 5.91 MILLION/UL (ref 3.88–5.62)
SODIUM SERPL-SCNC: 137 MMOL/L (ref 135–147)
WBC # BLD AUTO: 16.03 THOUSAND/UL (ref 4.31–10.16)

## 2024-11-11 PROCEDURE — 80053 COMPREHEN METABOLIC PANEL: CPT | Performed by: EMERGENCY MEDICINE

## 2024-11-11 PROCEDURE — 99285 EMERGENCY DEPT VISIT HI MDM: CPT

## 2024-11-11 PROCEDURE — 93005 ELECTROCARDIOGRAM TRACING: CPT

## 2024-11-11 PROCEDURE — 71046 X-RAY EXAM CHEST 2 VIEWS: CPT

## 2024-11-11 PROCEDURE — 85025 COMPLETE CBC W/AUTO DIFF WBC: CPT | Performed by: EMERGENCY MEDICINE

## 2024-11-11 PROCEDURE — 84484 ASSAY OF TROPONIN QUANT: CPT | Performed by: EMERGENCY MEDICINE

## 2024-11-11 PROCEDURE — 36415 COLL VENOUS BLD VENIPUNCTURE: CPT

## 2024-11-11 PROCEDURE — 99285 EMERGENCY DEPT VISIT HI MDM: CPT | Performed by: EMERGENCY MEDICINE

## 2024-11-11 NOTE — Clinical Note
Milind Britton was seen and treated in our emergency department on 11/11/2024.                Diagnosis:     Milind  may return to work on return date.    He may return on this date: 11/18/2024         If you have any questions or concerns, please don't hesitate to call.      Baljeet Rayo, DO    ______________________________           _______________          _______________  Hospital Representative                              Date                                Time

## 2024-11-11 NOTE — ED PROVIDER NOTES
Time reflects when diagnosis was documented in both MDM as applicable and the Disposition within this note       Time User Action Codes Description Comment    11/11/2024  1:44 PM Baljeet Rayo Add [R07.9] Chest pain           ED Disposition       ED Disposition   Discharge    Condition   Stable    Date/Time   Mon Nov 11, 2024  1:44 PM    Comment   Milind Britton discharge to home/self care.                   Assessment & Plan       Medical Decision Making  Left chest pain rule out acute coronary syndrome musculoskeletal pain esophagitis pneumothorax workup in progress EKG chest x-ray lab work    Amount and/or Complexity of Data Reviewed  Labs: ordered.  Radiology: ordered.             Medications - No data to display    ED Risk Strat Scores   HEART Risk Score      Flowsheet Row Most Recent Value   Heart Score Risk Calculator    History 1 Filed at: 11/11/2024 1343   ECG 1 Filed at: 11/11/2024 1343   Age 1 Filed at: 11/11/2024 1343   Risk Factors 1 Filed at: 11/11/2024 1343   Troponin 0 Filed at: 11/11/2024 1343   HEART Score 4 Filed at: 11/11/2024 1343                               SBIRT 22yo+      Flowsheet Row Most Recent Value   Initial Alcohol Screen: US AUDIT-C     1. How often do you have a drink containing alcohol? 0 Filed at: 11/11/2024 1158   2. How many drinks containing alcohol do you have on a typical day you are drinking?  0 Filed at: 11/11/2024 1158   3a. Male UNDER 65: How often do you have five or more drinks on one occasion? 0 Filed at: 11/11/2024 1158   3b. FEMALE Any Age, or MALE 65+: How often do you have 4 or more drinks on one occassion? 0 Filed at: 11/11/2024 1158   Audit-C Score 0 Filed at: 11/11/2024 1158   MISAEL: How many times in the past year have you...    Used an illegal drug or used a prescription medication for non-medical reasons? Never Filed at: 11/11/2024 1158                            History of Present Illness       Chief Complaint   Patient presents with    Chest Pain     Pt to ED  "c/o chest pain. States \"it feels like I just slept in the same spot all night\" pain in center of chest. Denies SOB.        History reviewed. No pertinent past medical history.   History reviewed. No pertinent surgical history.   History reviewed. No pertinent family history.   Social History     Tobacco Use    Smoking status: Never     Passive exposure: Never    Smokeless tobacco: Never   Substance Use Topics    Alcohol use: Not Currently    Drug use: Never      E-Cigarette/Vaping      E-Cigarette/Vaping Substances      I have reviewed and agree with the history as documented.     59-year-old male with left chest soreness since 5:00 this morning pretty much resolved at this time had some nausea no vomiting or diarrhea.  Patient was evaluated by hematology oncology for elevated white count and platelets.  Also followed by cardiology Dr. Parada for PACs had a stress test approximately 10 years ago which was unremarkable he is a non-smoker does have a history of hypertension and hypercholesterolemia      History provided by:  Patient and spouse  Medical Problem  Location:  Left chest  Quality:  Soreness  Severity:  Moderate  Onset quality:  Unable to specify  Duration:  7 hours  Timing:  Constant  Progression:  Resolved  Chronicity:  New  Context:  Left-sided chest pain/soreness since this morning  Relieved by:  Time  Worsened by:  Nothing  Associated symptoms: chest pain and nausea        Review of Systems   Cardiovascular:  Positive for chest pain.   Gastrointestinal:  Positive for nausea.   All other systems reviewed and are negative.          Objective       ED Triage Vitals   Temperature Pulse Blood Pressure Respirations SpO2 Patient Position - Orthostatic VS   11/11/24 1156 11/11/24 1156 11/11/24 1157 11/11/24 1156 11/11/24 1156 11/11/24 1156   97.9 °F (36.6 °C) 78 (!) 157/102 18 98 % Sitting      Temp Source Heart Rate Source BP Location FiO2 (%) Pain Score    11/11/24 1156 11/11/24 1156 11/11/24 1156 -- " 11/11/24 1156    Temporal Monitor Left arm  3      Vitals      Date and Time Temp Pulse SpO2 Resp BP Pain Score FACES Pain Rating User   11/11/24 1330 -- 86 96 % 18 155/81 -- -- AM   11/11/24 1300 -- 61 97 % 18 170/82 -- -- AM   11/11/24 1157 -- -- -- -- 157/102 -- -- ML   11/11/24 1156 97.9 °F (36.6 °C) 78 98 % 18 -- 3 -- ML            Physical Exam  Vitals and nursing note reviewed.   Constitutional:       General: He is not in acute distress.     Appearance: He is not ill-appearing, toxic-appearing or diaphoretic.   HENT:      Head: Normocephalic and atraumatic.      Right Ear: Tympanic membrane, ear canal and external ear normal.      Left Ear: Tympanic membrane, ear canal and external ear normal.      Mouth/Throat:      Mouth: Mucous membranes are dry.   Eyes:      General:         Right eye: No discharge.         Left eye: No discharge.      Extraocular Movements: Extraocular movements intact.      Pupils: Pupils are equal, round, and reactive to light.   Cardiovascular:      Rate and Rhythm: Normal rate and regular rhythm.      Pulses: Normal pulses.      Heart sounds: No murmur heard.     No gallop.   Pulmonary:      Effort: Pulmonary effort is normal. No respiratory distress.      Breath sounds: No stridor. No wheezing, rhonchi or rales.   Abdominal:      General: There is no distension.      Palpations: Abdomen is soft.      Tenderness: There is no abdominal tenderness. There is no guarding or rebound.   Musculoskeletal:         General: No swelling, tenderness, deformity or signs of injury. Normal range of motion.      Cervical back: Normal range of motion and neck supple. No rigidity.      Right lower leg: No edema.      Left lower leg: No edema.   Skin:     General: Skin is warm and dry.      Coloration: Skin is not jaundiced.      Findings: No bruising.   Neurological:      General: No focal deficit present.      Mental Status: He is alert and oriented to person, place, and time.      Cranial Nerves:  No cranial nerve deficit.      Sensory: No sensory deficit.   Psychiatric:         Mood and Affect: Mood normal.         Behavior: Behavior normal.         Thought Content: Thought content normal.         Results Reviewed       Procedure Component Value Units Date/Time    HS Troponin 0hr (reflex protocol) [[id number]]  (Normal) Collected: 11/11/24 1208    Lab Status: Final result Specimen: Blood from Arm, Left Updated: 11/11/24 1238     hs TnI 0hr <2 ng/L     HS Troponin I 2hr [[id number]]     Lab Status: No result Specimen: Blood     Comprehensive metabolic panel [[id number]]  (Abnormal) Collected: 11/11/24 1208    Lab Status: Final result Specimen: Blood from Arm, Left Updated: 11/11/24 1233     Sodium 137 mmol/L      Potassium 4.2 mmol/L      Chloride 103 mmol/L      CO2 26 mmol/L      ANION GAP 8 mmol/L      BUN 19 mg/dL      Creatinine 1.04 mg/dL      Glucose 119 mg/dL      Calcium 10.8 mg/dL      AST 22 U/L      ALT 26 U/L      Alkaline Phosphatase 62 U/L      Total Protein 7.8 g/dL      Albumin 4.7 g/dL      Total Bilirubin 1.24 mg/dL      eGFR 78 ml/min/1.73sq m     Narrative:      National Kidney Disease Foundation guidelines for Chronic Kidney Disease (CKD):     Stage 1 with normal or high GFR (GFR > 90 mL/min/1.73 square meters)    Stage 2 Mild CKD (GFR = 60-89 mL/min/1.73 square meters)    Stage 3A Moderate CKD (GFR = 45-59 mL/min/1.73 square meters)    Stage 3B Moderate CKD (GFR = 30-44 mL/min/1.73 square meters)    Stage 4 Severe CKD (GFR = 15-29 mL/min/1.73 square meters)    Stage 5 End Stage CKD (GFR <15 mL/min/1.73 square meters)  Note: GFR calculation is accurate only with a steady state creatinine    CBC and differential [[id number]]  (Abnormal) Collected: 11/11/24 1208    Lab Status: Final result Specimen: Blood from Arm, Left Updated: 11/11/24 1214     WBC 16.03 Thousand/uL      RBC 5.91 Million/uL      Hemoglobin 17.9 g/dL      Hematocrit 50.5 %      MCV 85 fL      MCH 30.3 pg      MCHC 35.4 g/dL       RDW 12.8 %      MPV 9.9 fL      Platelets 488 Thousands/uL      nRBC 0 /100 WBCs      Segmented % 73 %      Immature Grans % 1 %      Lymphocytes % 16 %      Monocytes % 8 %      Eosinophils Relative 1 %      Basophils Relative 1 %      Absolute Neutrophils 11.94 Thousands/µL      Absolute Immature Grans 0.09 Thousand/uL      Absolute Lymphocytes 2.51 Thousands/µL      Absolute Monocytes 1.26 Thousand/µL      Eosinophils Absolute 0.15 Thousand/µL      Basophils Absolute 0.08 Thousands/µL             XR chest 2 views   Final Interpretation by Vaibhav Jose MD (11/11 1227)      Areas of probable minimal platelike atelectasis or scarring in the left lower lateral lung field. No acute cardiopulmonary abnormality visible.            Workstation performed: [id number]             ECG 12 Lead Documentation Only    Date/Time: 11/11/2024 1:19 PM    Performed by: Baljeet Rayo DO  Authorized by: Baljeet Rayo DO    ECG reviewed by me, the ED Provider: yes    Patient location:  ED  Rate:     ECG rate:  80  Rhythm:     Rhythm: sinus rhythm    Ectopy:     Ectopy: PAC    Conduction:     Conduction: normal    T waves:     T waves: non-specific        ED Medication and Procedure Management   Prior to Admission Medications   Prescriptions Last Dose Informant Patient Reported? Taking?   ALPRAZolam (XANAX) 0.5 mg tablet  Self Yes No   Sig: Take 0.5 mg by mouth daily as needed   Cholecalciferol 25 MCG (1000 UT) capsule  Self Yes No   Sig: Take 2,000 Units by mouth daily   Omega-3 Fatty Acids (Fish Oil) 1000 MG CPDR  Self Yes No   Sig: Take 1 capsule by mouth   amitriptyline (ELAVIL) 10 mg tablet  Self Yes No   Sig: Take 10 mg by mouth daily at bedtime   candesartan (ATACAND) 16 mg tablet  Self No No   Sig: Take 1 tablet (16 mg total) by mouth daily   famotidine (PEPCID) 20 mg tablet  Self Yes No   Sig: Take 20 mg by mouth daily   metoprolol succinate (TOPROL-XL) 25 mg 24 hr tablet   No No   Sig: TAKE ONE TABLET BY  MOUTH DAILY   omeprazole (PriLOSEC) 20 mg delayed release capsule  Self Yes No   Sig: Take 20 mg by mouth daily   rosuvastatin (CRESTOR) 20 MG tablet   No No   Sig: Take 1 tablet (20 mg total) by mouth daily   zolpidem (AMBIEN CR) 6.25 MG CR tablet  Self Yes No   Sig: Take 6.25 mg by mouth daily at bedtime as needed      Facility-Administered Medications: None     Patient's Medications   Discharge Prescriptions    No medications on file       ED SEPSIS DOCUMENTATION   Time reflects when diagnosis was documented in both MDM as applicable and the Disposition within this note       Time User Action Codes Description Comment    11/11/2024  1:44 PM Baljeet Rayo Add [R07.9] Chest pain                  Baljeet Rayo DO  11/11/24 1344

## 2024-11-12 LAB
ATRIAL RATE: 80 BPM
P AXIS: 39 DEGREES
PR INTERVAL: 144 MS
QRS AXIS: 56 DEGREES
QRSD INTERVAL: 80 MS
QT INTERVAL: 378 MS
QTC INTERVAL: 436 MS
T WAVE AXIS: 50 DEGREES
VENTRICULAR RATE: 80 BPM

## 2024-11-12 PROCEDURE — 93010 ELECTROCARDIOGRAM REPORT: CPT | Performed by: INTERNAL MEDICINE

## 2024-11-20 ENCOUNTER — TELEPHONE (OUTPATIENT)
Age: 59
End: 2024-11-20

## 2024-11-20 NOTE — TELEPHONE ENCOUNTER
"Hello,    The following message was sent via e-mail to the leadership team:     Please advise if you can help facilitate the following overbook request:    Patient Name: Milind Britton     Patient MRN: [id number]    Call back #: [phone]    Insurance: [employer]    Department:Cardiology    Speciality: General Cardiology    Reason for overbook request: PROVIDER REQUEST    Comments (Write \"N/a\" if no comments): PCP requesting pt be seen sooner    Requested doctor and location: [facility]     Date of current appointment: 12/4/24      Thank you.      "

## 2024-11-20 NOTE — TELEPHONE ENCOUNTER
Francoise called from [facility] stating that PCP Dr. Lowe is requesting that pt be seen sooner by cardiology. Francoise states that pt was seen in the ED for chest pain- MI was ruled out, troponin levels were negative, WBC elevated. Pt continues with steady pain on the left side of the chest.     Sending message to overbook team to get patient in sooner.

## 2024-11-22 ENCOUNTER — TELEPHONE (OUTPATIENT)
Age: 59
End: 2024-11-22

## 2024-11-22 NOTE — TELEPHONE ENCOUNTER
Pt called back stating [facility] needs for us to request ECHO results.  Spoke with Awa at [facility] medical records who stated the office can fax a STAT Urgent records release on  letter head to [phone].  Spoke with Melisa in [place] office to relay information.

## 2024-11-22 NOTE — TELEPHONE ENCOUNTER
Received a call from Milind escamilla a Echo at [facility] yesterday and looking for the results,     Advised unable to see will have office reach to [facility] outpatient facility and get the copy for the chart and have provider review.   [phone] tele   Pt is scheduled for an appt on 12/4/2024

## 2024-11-22 NOTE — TELEPHONE ENCOUNTER
Spoke with pt. Pt is going to have [facility] fax them over to us for our records as we did not order the testing.

## 2024-11-25 ENCOUNTER — OFFICE VISIT (OUTPATIENT)
Dept: CARDIOLOGY CLINIC | Facility: CLINIC | Age: 59
End: 2024-11-25
Payer: COMMERCIAL

## 2024-11-25 VITALS
DIASTOLIC BLOOD PRESSURE: 78 MMHG | HEART RATE: 98 BPM | SYSTOLIC BLOOD PRESSURE: 100 MMHG | WEIGHT: 211 LBS | BODY MASS INDEX: 31.25 KG/M2 | HEIGHT: 69 IN | OXYGEN SATURATION: 97 %

## 2024-11-25 DIAGNOSIS — R07.9 CHEST PAIN: Primary | ICD-10-CM

## 2024-11-25 DIAGNOSIS — D72.829 LEUKOCYTOSIS, UNSPECIFIED TYPE: ICD-10-CM

## 2024-11-25 DIAGNOSIS — E78.00 HYPERCHOLESTEROLEMIA: ICD-10-CM

## 2024-11-25 DIAGNOSIS — D75.839 THROMBOCYTOSIS: ICD-10-CM

## 2024-11-25 DIAGNOSIS — I10 PRIMARY HYPERTENSION: ICD-10-CM

## 2024-11-25 PROCEDURE — 99215 OFFICE O/P EST HI 40 MIN: CPT | Performed by: NURSE PRACTITIONER

## 2024-11-25 RX ORDER — ROSUVASTATIN CALCIUM 20 MG/1
20 TABLET, COATED ORAL DAILY
Qty: 90 TABLET | Refills: 3 | Status: SHIPPED | OUTPATIENT
Start: 2024-11-25

## 2024-11-25 RX ORDER — METOPROLOL SUCCINATE 25 MG/1
25 TABLET, EXTENDED RELEASE ORAL DAILY
Qty: 90 TABLET | Refills: 3 | Status: SHIPPED | OUTPATIENT
Start: 2024-11-25

## 2024-11-25 NOTE — PROGRESS NOTES
Cardiology Follow Up    Milind Britton  1965  [id number]  [facility]  [address] [address] [phone]    Assessment & Plan  Chest pain  Right lower mid sternal CP, occurring with exertion such as pulling carts at work triggering the CP, 4/10 dull pain non radiating, relieved with rest.  Sometimes pain last a few hours and then settles down.  According to Milind he took Ibuprofen with some improvement of CP.    Exercise stress test R/O Ischemia   Instructed to hold metoprolol succinate prior to stress test  Primary hypertension  RUE sitting 122/70 controlled on Atacand 16 mg daily, mature succinate 25 mg daily  DASH Diet   Hypercholesterolemia  6/10/24   HDL 43 LDL 66  Continue on Crestor 20mg daily, oil 1000 mg daily  DASH Heart healthy diet  Thrombocytosis  11/11/24 Plt 488   Advised to follow up with Hematology   Leukocytosis, unspecified type  11/11/24 WBC 16.03   Advised to follow up with hematology      Interval History:   Mr Milind Campuzano presents to our office for a follow up visit. On 11/11/24 Milind presented to [facility] emergency room with chest pain.  He can plaint of midsternal chest discomfort stating it felt like he slept in the same spot all night pain was at the center of his chest he denies shortness of breath.  In ED he hematology and oncology were consulted for elevated white blood cell count and platelets.  Blood pressure 157/102. troponin less than 2.  Chest x-ray showed areas of probable minimal platelike atelectasis or scarring in the left lower lung field no acute cardiopulmonary abnormality.  EKG sinus rhythm 80 bpm PAC nonspecific T wave abnormality.  He was discharged home.    Milind presents to our office for a follow up visit due to continued complaints of CP.  Milind continues with right lower mid sternal CP, occurring with exertion such as pulling  carts at work triggering the CP, 4/10 dull pain non radiating, relieved with rest.  Sometimes pain last a few hours and then settles down.  According to Milind he took Ibuprofen with some improvement of CP.        Medical History   Primary Cardiologist Dr Parada  Hypertension  Hypercholesterolemia 6/10/24   HDL 43 LDL 66  Hyperparathyroidism  Thrombocytosis has follow-up with hematology in the past, 11/11/24 Plt 488   Leucocytosis WBC 16.03 follow up with  Hematology   Buerger's Disease  Proteinuria  Hypercalcemia   Family hx of CAD grandfather passed away in his 50's of MI, mother with CAD and hx of Stents     11/22/24 TTE Showed normal LV function LVEF 60%.  Normal RV function, mild MR  Patient Active Problem List   Diagnosis    Primary hypertension    PVC (premature ventricular contraction)    PAC (premature atrial contraction)    Pure hypercholesterolemia     No past medical history on file.  Social History     Socioeconomic History    Marital status: /Civil Union     Spouse name: Not on file    Number of children: Not on file    Years of education: Not on file    Highest education level: Not on file   Occupational History    Not on file   Tobacco Use    Smoking status: Never     Passive exposure: Never    Smokeless tobacco: Never   Substance and Sexual Activity    Alcohol use: Not Currently    Drug use: Never    Sexual activity: Not on file   Other Topics Concern    Not on file   Social History Narrative    Not on file     Social Drivers of Health     Financial Resource Strain: Not on file   Food Insecurity: Not on file   Transportation Needs: Not on file   Physical Activity: Not on file   Stress: Not on file   Social Connections: Not on file   Intimate Partner Violence: Not on file   Housing Stability: Not on file      No family history on file.  No past surgical history on file.    Current Outpatient Medications:     ALPRAZolam (XANAX) 0.5 mg tablet, Take 0.5 mg by mouth daily as needed,  Disp: , Rfl:     amitriptyline (ELAVIL) 10 mg tablet, Take 10 mg by mouth daily at bedtime, Disp: , Rfl:     candesartan (ATACAND) 16 mg tablet, Take 1 tablet (16 mg total) by mouth daily, Disp: 30 tablet, Rfl: 11    Cholecalciferol 25 MCG (1000 UT) capsule, Take 2,000 Units by mouth daily, Disp: , Rfl:     famotidine (PEPCID) 20 mg tablet, Take 20 mg by mouth daily, Disp: , Rfl:     metoprolol succinate (TOPROL-XL) 25 mg 24 hr tablet, TAKE ONE TABLET BY MOUTH DAILY, Disp: 90 tablet, Rfl: 1    Omega-3 Fatty Acids (Fish Oil) 1000 MG CPDR, Take 1 capsule by mouth, Disp: , Rfl:     omeprazole (PriLOSEC) 20 mg delayed release capsule, Take 20 mg by mouth daily, Disp: , Rfl:     rosuvastatin (CRESTOR) 20 MG tablet, Take 1 tablet (20 mg total) by mouth daily, Disp: 90 tablet, Rfl: 3    zolpidem (AMBIEN CR) 6.25 MG CR tablet, Take 6.25 mg by mouth daily at bedtime as needed, Disp: , Rfl:   Allergies   Allergen Reactions    Naproxen Abdominal Pain and Nausea Only    Nsaids Other (See Comments)       Labs:  Admission on 11/11/2024, Discharged on 11/11/2024   Component Date Value    WBC 11/11/2024 16.03 (H)     RBC 11/11/2024 5.91 (H)     Hemoglobin 11/11/2024 17.9 (H)     Hematocrit 11/11/2024 50.5 (H)     MCV 11/11/2024 85     MCH 11/11/2024 30.3     MCHC 11/11/2024 35.4     RDW 11/11/2024 12.8     MPV 11/11/2024 9.9     Platelets 11/11/2024 488 (H)     nRBC 11/11/2024 0     Segmented % 11/11/2024 73     Immature Grans % 11/11/2024 1     Lymphocytes % 11/11/2024 16     Monocytes % 11/11/2024 8     Eosinophils Relative 11/11/2024 1     Basophils Relative 11/11/2024 1     Absolute Neutrophils 11/11/2024 11.94 (H)     Absolute Immature Grans 11/11/2024 0.09     Absolute Lymphocytes 11/11/2024 2.51     Absolute Monocytes 11/11/2024 1.26 (H)     Eosinophils Absolute 11/11/2024 0.15     Basophils Absolute 11/11/2024 0.08     Sodium 11/11/2024 137     Potassium 11/11/2024 4.2     Chloride 11/11/2024 103     CO2 11/11/2024 26      ANION GAP 11/11/2024 8     BUN 11/11/2024 19     Creatinine 11/11/2024 1.04     Glucose 11/11/2024 119     Calcium 11/11/2024 10.8 (H)     AST 11/11/2024 22     ALT 11/11/2024 26     Alkaline Phosphatase 11/11/2024 62     Total Protein 11/11/2024 7.8     Albumin 11/11/2024 4.7     Total Bilirubin 11/11/2024 1.24 (H)     eGFR 11/11/2024 78     hs TnI 0hr 11/11/2024 <2     Ventricular Rate 11/11/2024 80     Atrial Rate 11/11/2024 80     AZ Interval 11/11/2024 144     QRSD Interval 11/11/2024 80     QT Interval 11/11/2024 378     QTC Interval 11/11/2024 436     P Axis 11/11/2024 39     QRS [place] 11/11/2024 56     T Wave [place] 11/11/2024 50      Imaging: XR chest 2 views  Result Date: 11/11/2024  Narrative: XR CHEST PA AND LATERAL INDICATION: Chest Pain. COMPARISON: None FINDINGS: Streaky linear densities in the left lower lateral lung field suggest areas of atelectasis or perhaps some scarring. No consolidation to suggest acute pneumonia. Right lung is unremarkable. No pneumothorax or pleural effusion. Normal cardiomediastinal silhouette. Bones are unremarkable for age. Surgical clips are seen within the abdomen.     Impression: Areas of probable minimal platelike atelectasis or scarring in the left lower lateral lung field. No acute cardiopulmonary abnormality visible. Workstation performed: [id number]       Review of Systems:  Review of Systems   Cardiovascular:  Positive for chest pain.   All other systems reviewed and are negative.      Physical Exam:  Physical Exam  Vitals reviewed.   Constitutional:       Appearance: He is well-developed.   HENT:      Head: Normocephalic.   Cardiovascular:      Rate and Rhythm: Normal rate and regular rhythm.      Heart sounds: Normal heart sounds.   Pulmonary:      Effort: Pulmonary effort is normal.      Breath sounds: Normal breath sounds.   Musculoskeletal:         General: Normal range of motion.      Cervical back: Normal range of motion and neck supple.      Right lower leg:  No edema.      Left lower leg: No edema.   Skin:     General: Skin is warm and dry.   Neurological:      General: No focal deficit present.      Mental Status: He is alert and oriented to person, place, and time.   Psychiatric:         Mood and Affect: Mood normal.         Behavior: Behavior normal.

## 2024-11-25 NOTE — ASSESSMENT & PLAN NOTE
6/10/24   HDL 43 LDL 66  Continue on Crestor 20mg daily, oil 1000 mg daily  DASH Heart healthy diet

## 2024-11-25 NOTE — ASSESSMENT & PLAN NOTE
MARIA VICTORIA sitting 122/70 controlled on Atacand 16 mg daily, mature succinate 25 mg daily  DASH Diet

## 2024-11-27 ENCOUNTER — TELEPHONE (OUTPATIENT)
Dept: CARDIOLOGY CLINIC | Facility: CLINIC | Age: 59
End: 2024-11-27

## 2024-11-27 NOTE — TELEPHONE ENCOUNTER
When able, please see media images from (11/22 5:27 PM) from [facility] pt ECHO report summary. Pt asking for your opinion on report. Did advise pt that you did not order this ECHO testing so you may not be able to fully advise on report.

## 2024-12-13 ENCOUNTER — HOSPITAL ENCOUNTER (OUTPATIENT)
Dept: NON INVASIVE DIAGNOSTICS | Age: 59
Discharge: HOME | End: 2024-12-13
Payer: COMMERCIAL

## 2024-12-13 DIAGNOSIS — R07.9 CHEST PAIN: ICD-10-CM

## 2024-12-13 LAB
CHEST PAIN STATEMENT: NORMAL
MAX DIASTOLIC BP: 80 MMHG
MAX HR PERCENT: 85 %
MAX HR: 139 BPM
MAX PREDICTED HEART RATE: 162 BPM
PROTOCOL NAME: NORMAL
RATE PRESSURE PRODUCT: NORMAL
REASON FOR TERMINATION: NORMAL
SL CV STRESS STAGE REACHED: 3
STRESS ANGINA INDEX: 0
STRESS BASELINE HR: 90 BPM
STRESS PEAK HR: 139 BPM
STRESS POST ESTIMATED WORKLOAD: 10.1 METS
STRESS POST EXERCISE DUR MIN: 9 MIN
STRESS POST EXERCISE DUR MIN: 9 MIN
STRESS POST EXERCISE DUR SEC: 0 SEC
STRESS POST EXERCISE DUR SEC: 0 SEC
STRESS POST PEAK BP: 170 MMHG
STRESS POST PEAK HR: 139 BPM
STRESS POST PEAK SYSTOLIC BP: 174 MMHG
TARGET HR FORMULA: NORMAL
TEST INDICATION: NORMAL

## 2024-12-13 PROCEDURE — 93018 CV STRESS TEST I&R ONLY: CPT | Performed by: INTERNAL MEDICINE

## 2024-12-13 PROCEDURE — 93016 CV STRESS TEST SUPVJ ONLY: CPT | Performed by: INTERNAL MEDICINE

## 2024-12-13 PROCEDURE — 93017 CV STRESS TEST TRACING ONLY: CPT

## 2024-12-16 ENCOUNTER — RESULTS FOLLOW-UP (OUTPATIENT)
Dept: CARDIOLOGY CLINIC | Facility: CLINIC | Age: 59
End: 2024-12-16

## 2024-12-16 LAB
CHEST PAIN STATEMENT: NORMAL
MAX DIASTOLIC BP: 80 MMHG
MAX PREDICTED HEART RATE: 162 BPM
PROTOCOL NAME: NORMAL
REASON FOR TERMINATION: NORMAL
STRESS POST EXERCISE DUR MIN: 9 MIN
STRESS POST EXERCISE DUR SEC: 0 SEC
STRESS POST PEAK HR: 139 BPM
STRESS POST PEAK SYSTOLIC BP: 174 MMHG
TARGET HR FORMULA: NORMAL
TEST INDICATION: NORMAL

## 2024-12-16 NOTE — TELEPHONE ENCOUNTER
----- Message from DEREK Rhodes sent at 12/16/2024  2:34 PM EST -----  Please call Milind and inform him the exercise stress test was normal.  Thank you     Spoke with pt re: normal ST rslts.

## 2025-02-13 DIAGNOSIS — E78.00 HYPERCHOLESTEROLEMIA: ICD-10-CM

## 2025-02-13 RX ORDER — METOPROLOL SUCCINATE 25 MG/1
25 TABLET, EXTENDED RELEASE ORAL DAILY
Qty: 90 TABLET | Refills: 1 | Status: SHIPPED | OUTPATIENT
Start: 2025-02-13

## 2025-05-14 ENCOUNTER — TELEPHONE (OUTPATIENT)
Age: 60
End: 2025-05-14

## 2025-05-14 NOTE — TELEPHONE ENCOUNTER
Delia from [facility] called and stated she needed an updated EKG sooner than 11/11/24 that we have on file for the pt, she was advised that the provider cleared pt without having to see him prior to surgery tomorrow. Pt will need to have that done with PAT through [facility]    Also fax was checked with Delia and was given correctly the first time.  F# provided by pt was incorrect.

## 2025-05-14 NOTE — TELEPHONE ENCOUNTER
Delia called back stating the information she received was not correct - call warm transferred to Jennifer in the [facility] office for further assistance.

## 2025-05-14 NOTE — TELEPHONE ENCOUNTER
Delia from [facility] called stating that she faxed over a cardiac clearance on 5/6 she states that pt is having a right knee arthroscopy tomorrow and needs the clearance and recent EKG faxed today.     Please follow up with Delia   [phone]-fax  [phone]- phone

## 2025-05-14 NOTE — TELEPHONE ENCOUNTER
Lov notes and last EKG has been faxed, provider to sign CC and will fax as soon as its addressed.     Please see letter that's prepped and ready to be signed.